# Patient Record
Sex: FEMALE | Race: WHITE | NOT HISPANIC OR LATINO | URBAN - METROPOLITAN AREA
[De-identification: names, ages, dates, MRNs, and addresses within clinical notes are randomized per-mention and may not be internally consistent; named-entity substitution may affect disease eponyms.]

---

## 2020-10-17 ENCOUNTER — HOSPITAL ENCOUNTER (OUTPATIENT)
Facility: MEDICAL CENTER | Age: 30
End: 2020-10-17
Payer: COMMERCIAL

## 2020-10-18 LAB
COVID ORDER STATUS COVID19: NORMAL
SARS-COV-2 RNA RESP QL NAA+PROBE: NOTDETECTED
SPECIMEN SOURCE: NORMAL

## 2022-01-05 ENCOUNTER — HOSPITAL ENCOUNTER (OUTPATIENT)
Facility: MEDICAL CENTER | Age: 32
End: 2022-01-05
Attending: PHYSICIAN ASSISTANT
Payer: OTHER GOVERNMENT

## 2022-01-05 ENCOUNTER — OFFICE VISIT (OUTPATIENT)
Dept: URGENT CARE | Facility: CLINIC | Age: 32
End: 2022-01-05
Payer: OTHER GOVERNMENT

## 2022-01-05 VITALS
OXYGEN SATURATION: 98 % | DIASTOLIC BLOOD PRESSURE: 64 MMHG | RESPIRATION RATE: 16 BRPM | SYSTOLIC BLOOD PRESSURE: 112 MMHG | WEIGHT: 160.6 LBS | HEART RATE: 94 BPM | TEMPERATURE: 98.3 F | BODY MASS INDEX: 28.46 KG/M2 | HEIGHT: 63 IN

## 2022-01-05 DIAGNOSIS — Z20.822 SUSPECTED COVID-19 VIRUS INFECTION: ICD-10-CM

## 2022-01-05 PROCEDURE — U0003 INFECTIOUS AGENT DETECTION BY NUCLEIC ACID (DNA OR RNA); SEVERE ACUTE RESPIRATORY SYNDROME CORONAVIRUS 2 (SARS-COV-2) (CORONAVIRUS DISEASE [COVID-19]), AMPLIFIED PROBE TECHNIQUE, MAKING USE OF HIGH THROUGHPUT TECHNOLOGIES AS DESCRIBED BY CMS-2020-01-R: HCPCS

## 2022-01-05 PROCEDURE — 99203 OFFICE O/P NEW LOW 30 MIN: CPT | Mod: CS | Performed by: PHYSICIAN ASSISTANT

## 2022-01-05 PROCEDURE — U0005 INFEC AGEN DETEC AMPLI PROBE: HCPCS

## 2022-01-06 DIAGNOSIS — Z20.822 SUSPECTED COVID-19 VIRUS INFECTION: ICD-10-CM

## 2022-01-06 LAB — COVID ORDER STATUS COVID19: NORMAL

## 2022-01-06 ASSESSMENT — ENCOUNTER SYMPTOMS
SHORTNESS OF BREATH: 0
FEVER: 1
COUGH: 1
MYALGIAS: 1
CHILLS: 1
VOMITING: 0
NAUSEA: 0
DIARRHEA: 0
HEADACHES: 1
SORE THROAT: 0
SPUTUM PRODUCTION: 1
WHEEZING: 0
ABDOMINAL PAIN: 0

## 2022-01-06 NOTE — PROGRESS NOTES
"Arti Vital is a 31 y.o. female who presents with Cough (Tested positive with an at-home test, but needs a PCR for work.  Cough, fever (102.0 on Tuesday), phlegm, runny nose x 2 days)            Fever   This is a new problem. Episode onset: 2 days. The problem occurs intermittently. The problem has been unchanged. The maximum temperature noted was 102 to 102.9 F. Associated symptoms include congestion, coughing (for 1 week ), headaches, muscle aches and sleepiness. Pertinent negatives include no abdominal pain, chest pain, diarrhea, ear pain, nausea, rash, sore throat, vomiting or wheezing.   Risk factors: sick contacts ( and children have been sick. Patient works at a school.)      The patient is vaccinated. She took a home COVID test tonight and it came back positive.      No past medical history on file.    No past surgical history on file.    No family history on file.    No Known Allergies    Medications, Allergies, and current problem list reviewed today in Epic      Review of Systems   Constitutional: Positive for chills, fever and malaise/fatigue.   HENT: Positive for congestion. Negative for ear pain and sore throat.    Respiratory: Positive for cough (for 1 week ) and sputum production. Negative for shortness of breath and wheezing.    Cardiovascular: Negative for chest pain and leg swelling.   Gastrointestinal: Negative for abdominal pain, diarrhea, nausea and vomiting.   Musculoskeletal: Positive for myalgias.   Skin: Negative for rash.   Neurological: Positive for headaches.         All other systems reviewed and are negative.       Objective     /64 (BP Location: Right arm, Patient Position: Sitting, BP Cuff Size: Adult)   Pulse 94   Temp 36.8 °C (98.3 °F) (Temporal)   Resp 16   Ht 1.6 m (5' 3\")   Wt 72.8 kg (160 lb 9.6 oz)   SpO2 98%   BMI 28.45 kg/m²      Physical Exam  Constitutional:       General: She is not in acute distress.     Appearance: She is " ill-appearing.   HENT:      Head: Normocephalic and atraumatic.      Nose: Nose normal.      Mouth/Throat:      Mouth: Mucous membranes are moist.      Pharynx: No posterior oropharyngeal erythema.   Eyes:      Conjunctiva/sclera: Conjunctivae normal.   Cardiovascular:      Rate and Rhythm: Normal rate and regular rhythm.      Heart sounds: Normal heart sounds.   Pulmonary:      Effort: Pulmonary effort is normal. No respiratory distress.      Breath sounds: Normal breath sounds. No wheezing, rhonchi or rales.   Skin:     General: Skin is warm and dry.      Findings: No rash.   Neurological:      General: No focal deficit present.      Mental Status: She is alert and oriented to person, place, and time.   Psychiatric:         Mood and Affect: Mood normal.         Behavior: Behavior normal.         Thought Content: Thought content normal.         Judgment: Judgment normal.                             Assessment & Plan        1. Suspected COVID-19 virus infection    - SARS-CoV-2 PCR (24 hour In-House): Collect NP swab in VTM; Future- pending.  Isolation guidelines, conservative measures and ER precautions discussed.     Differential diagnoses, Supportive care, and indications for immediate follow-up discussed with patient.   Pathogenesis of diagnosis discussed including typical length and natural progression.   Instructed to return to clinic or nearest emergency department for any change in condition, further concerns, or worsening of symptoms.    The patient demonstrated a good understanding and agreed with the treatment plan.    Gretta Cevallos P.A.-C.

## 2022-01-07 LAB
SARS-COV-2 RNA RESP QL NAA+PROBE: DETECTED
SPECIMEN SOURCE: ABNORMAL

## 2023-04-25 ENCOUNTER — OFFICE VISIT (OUTPATIENT)
Dept: CARDIOLOGY | Facility: MEDICAL CENTER | Age: 33
End: 2023-04-25
Payer: OTHER GOVERNMENT

## 2023-04-25 VITALS
HEIGHT: 62 IN | BODY MASS INDEX: 29.44 KG/M2 | RESPIRATION RATE: 16 BRPM | DIASTOLIC BLOOD PRESSURE: 64 MMHG | SYSTOLIC BLOOD PRESSURE: 118 MMHG | WEIGHT: 160 LBS | HEART RATE: 70 BPM | OXYGEN SATURATION: 99 %

## 2023-04-25 DIAGNOSIS — I47.10 SVT (SUPRAVENTRICULAR TACHYCARDIA) (HCC): ICD-10-CM

## 2023-04-25 DIAGNOSIS — R00.2 PALPITATIONS: ICD-10-CM

## 2023-04-25 DIAGNOSIS — R00.9 ABNORMAL HEART RATE: ICD-10-CM

## 2023-04-25 LAB — EKG IMPRESSION: NORMAL

## 2023-04-25 PROCEDURE — 93000 ELECTROCARDIOGRAM COMPLETE: CPT | Performed by: INTERNAL MEDICINE

## 2023-04-25 PROCEDURE — 99204 OFFICE O/P NEW MOD 45 MIN: CPT | Performed by: INTERNAL MEDICINE

## 2023-04-25 NOTE — PROGRESS NOTES
"    Cardiology Initial Consultation Note    Date of note:    4/25/2023      Patient Name: Kal Vital     YOB: 1990  MRN:              [id number]    Chief Complaint: Palpitations    Kal Vital is a 32 y.o. female  patient presented today with palpitations.  She has very distinct episodes of sudden onset palpitations, heart rate goes up to 170 associated with chest pain, shortness of breath, head pounding.  Sometimes she wakes up in the middle of the night with palpitations as well.  Also complains of occasional chest pains does not last long, not associated with activity.    Physical Exam:  Ambulatory Vitals  /64 (BP Location: Left arm, Patient Position: Sitting, BP Cuff Size: Adult)   Pulse 70   Resp 16   Ht 1.575 m (5' 2\")   Wt 72.6 kg (160 lb)   SpO2 99%    Oxygen Therapy:  Pulse Oximetry: 99 %  BP Readings from Last 4 Encounters:   04/25/23 118/64   01/05/22 112/64       Weight/BMI: Body mass index is 29.26 kg/m².  Wt Readings from Last 4 Encounters:   04/25/23 72.6 kg (160 lb)   01/05/22 72.8 kg (160 lb 9.6 oz)           General: Well appearing and in no apparent distress  Neck: carotid bruits absent  Lungs: respiratory sounds  normal  Heart: Regular rhythm,  No palpable thrills on palpation, murmurs absent, no rubs,   Lowe extremity edema absent.     EKG normal sinus rhythm.    Medical Decision Making:  Problem List Items Addressed This Visit       Palpitations     Other Visit Diagnoses       Abnormal heart rate        Relevant Orders    EKG (Completed)    SVT (supraventricular tachycardia) (HCC)        Relevant Orders    Holter Monitor Study    EC-ECHOCARDIOGRAM COMPLETE W/O CONT          32-year-old female patient with palpitations, likely supraventricular tachycardia by history.  Recommend 2-week Holter monitor, echocardiogram to further evaluate.  Vagal maneuvers discussed.  Further recommendations based on test results.    Follow-up in 6 months or sooner depending upon the " test results.    This note was dictated using Dragon speech recognition software.    Marcial GONZALEZ  Interventional cardiologist  [facility], [facility]  [address] [address]  Phone: [phone]  Fax: [phone]

## 2023-05-02 ENCOUNTER — NON-PROVIDER VISIT (OUTPATIENT)
Dept: CARDIOLOGY | Facility: MEDICAL CENTER | Age: 33
End: 2023-05-02
Attending: INTERNAL MEDICINE
Payer: OTHER GOVERNMENT

## 2023-05-02 DIAGNOSIS — I49.1 PREMATURE ATRIAL CONTRACTIONS: ICD-10-CM

## 2023-05-02 DIAGNOSIS — I47.10 SVT (SUPRAVENTRICULAR TACHYCARDIA) (HCC): ICD-10-CM

## 2023-05-02 DIAGNOSIS — I49.3 PVCS (PREMATURE VENTRICULAR CONTRACTIONS): ICD-10-CM

## 2023-05-24 ENCOUNTER — APPOINTMENT (OUTPATIENT)
Dept: CARDIOLOGY | Facility: MEDICAL CENTER | Age: 33
End: 2023-05-24
Payer: OTHER GOVERNMENT

## 2023-05-24 ENCOUNTER — TELEPHONE (OUTPATIENT)
Dept: CARDIOLOGY | Facility: MEDICAL CENTER | Age: 33
End: 2023-05-24

## 2023-05-25 ENCOUNTER — APPOINTMENT (OUTPATIENT)
Dept: CARDIOLOGY | Facility: MEDICAL CENTER | Age: 33
End: 2023-05-25
Payer: OTHER GOVERNMENT

## 2023-05-25 PROCEDURE — 93248 EXT ECG>7D<15D REV&INTERPJ: CPT | Performed by: INTERNAL MEDICINE

## 2023-05-26 NOTE — PROGRESS NOTES
Patient enrolled in the 14 day Zio XT Holter monitoring program per Marcial Davis MD..   >In-Clinic hook-up..   >Currently pending EOS.

## 2023-06-01 ENCOUNTER — OFFICE VISIT (OUTPATIENT)
Dept: CARDIOLOGY | Facility: MEDICAL CENTER | Age: 33
End: 2023-06-01
Payer: OTHER GOVERNMENT

## 2023-06-01 VITALS
HEIGHT: 62 IN | WEIGHT: 159 LBS | SYSTOLIC BLOOD PRESSURE: 118 MMHG | BODY MASS INDEX: 29.26 KG/M2 | DIASTOLIC BLOOD PRESSURE: 70 MMHG | RESPIRATION RATE: 14 BRPM | HEART RATE: 63 BPM | OXYGEN SATURATION: 100 %

## 2023-06-01 DIAGNOSIS — R00.2 PALPITATIONS: ICD-10-CM

## 2023-06-01 PROCEDURE — 3074F SYST BP LT 130 MM HG: CPT

## 2023-06-01 PROCEDURE — 3078F DIAST BP <80 MM HG: CPT

## 2023-06-01 PROCEDURE — 99213 OFFICE O/P EST LOW 20 MIN: CPT

## 2023-06-01 PROCEDURE — 99211 OFF/OP EST MAY X REQ PHY/QHP: CPT

## 2023-06-01 ASSESSMENT — ENCOUNTER SYMPTOMS
MUSCULOSKELETAL NEGATIVE: 1
DEPRESSION: 0
PND: 0
ORTHOPNEA: 0
NERVOUS/ANXIOUS: 0
EYES NEGATIVE: 1
SHORTNESS OF BREATH: 0
NEUROLOGICAL NEGATIVE: 1
CONSTITUTIONAL NEGATIVE: 1
PALPITATIONS: 0
GASTROINTESTINAL NEGATIVE: 1

## 2023-06-01 NOTE — PROGRESS NOTES
Chief Complaint   Patient presents with    Palpitations       Subjective     Kal Vital is a 32 y.o. female who presents today for follow-up of her palpitations.     They are a patient of Dr. Davis, last seen on 4/25/2023, at that visit she was being evaluated for frequent palpitations, she was asked to complete a cardiac event monitor and a echocardiogram.  Cardiac event monitor did not show any significant arrhythmias.    Since their last visit they have been feeling really well, no palpitations since cutting out caffeine. She did do carotid massage twice, which lowered her HR.     No symptoms of chest pain, palpitations, shortness of breath, exercise intolerance, dyspnea, or lower extremity edema.      History reviewed. No pertinent past medical history.  History reviewed. No pertinent surgical history.  History reviewed. No pertinent family history.  Social History     Socioeconomic History    Marital status:      Spouse name: Not on file    Number of children: Not on file    Years of education: Not on file    Highest education level: Not on file   Occupational History    Not on file   Tobacco Use    Smoking status: Never    Smokeless tobacco: Never   Vaping Use    Vaping Use: Never used   Substance and Sexual Activity    Alcohol use: Yes     Comment: rarely    Drug use: Never    Sexual activity: Yes     Partners: Male   Other Topics Concern    Not on file   Social History Narrative    Not on file     Social Determinants of Health     Financial Resource Strain: Not on file   Food Insecurity: Not on file   Transportation Needs: Not on file   Physical Activity: Not on file   Stress: Not on file   Social Connections: Not on file   Intimate Partner Violence: Not on file   Housing Stability: Not on file     No Known Allergies  No outpatient encounter medications on file as of 6/1/2023.     No facility-administered encounter medications on file as of 6/1/2023.     Review of Systems   Constitutional:  "Negative.    HENT: Negative.     Eyes: Negative.    Respiratory:  Negative for shortness of breath.    Cardiovascular:  Negative for chest pain, palpitations, orthopnea, leg swelling and PND.   Gastrointestinal: Negative.    Genitourinary: Negative.    Musculoskeletal: Negative.    Skin: Negative.    Neurological: Negative.    Endo/Heme/Allergies: Negative.    Psychiatric/Behavioral:  Negative for depression. The patient is not nervous/anxious.               Objective     /70 (BP Location: Left arm, Patient Position: Sitting, BP Cuff Size: Adult)   Pulse 63   Resp 14   Ht 1.575 m (5' 2\")   Wt 72.1 kg (159 lb)   SpO2 100%   BMI 29.08 kg/m²     Physical Exam  Constitutional:       Appearance: Normal appearance.   HENT:      Head: Normocephalic and atraumatic.   Neck:      Vascular: No JVD.   Cardiovascular:      Rate and Rhythm: Normal rate and regular rhythm.      Pulses: Normal pulses.      Heart sounds: Normal heart sounds. No murmur heard.     No friction rub.   Pulmonary:      Effort: Pulmonary effort is normal. No respiratory distress.      Breath sounds: Normal breath sounds.   Abdominal:      General: There is no distension.      Palpations: Abdomen is soft.   Musculoskeletal:      Right lower leg: No edema.      Left lower leg: No edema.   Skin:     General: Skin is warm and dry.   Neurological:      General: No focal deficit present.      Mental Status: She is alert and oriented to person, place, and time.   Psychiatric:         Mood and Affect: Mood normal.         Behavior: Behavior normal.            No results found for: CHOLSTRLTOT, LDL, HDL, TRIGLYCERIDE    Lab Results   Component Value Date/Time    SODIUM 136 11/05/2021 01:42 PM    POTASSIUM 3.8 11/05/2021 01:42 PM    CHLORIDE 106 11/05/2021 01:42 PM    CO2 25.0 11/05/2021 01:42 PM    GLUCOSE 86 11/05/2021 01:42 PM    BUN 9.0 11/05/2021 01:42 PM    CREATININE 0.7 11/05/2021 01:42 PM    BUNCREATRAT 12.9 11/05/2021 01:42 PM     No results " found for: ALKPHOSPHAT, ASTSGOT, ALTSGPT, TBILIRUBIN     Cardiac event monitor 5/24/2023  ZIO PATCH REPORT (05/02/23-05/16/23)   Zio Patch study showing predominately sinus rhythm with maximum rate of 158 bpm, minimum rate of 40 bpm, average of 77 bpm.   Atrial fibrillation: None.   Supraventricular tachycardia: None.   Pauses: None.   Heart block: None.   Ventricular tachycardia: None.   Rare <1% burden of premature atrial contractions.   Rare <1% burden of premature ventricular contractions.     Sinus rhythm noted with patient event.     Assessment & Plan     1. Palpitations            Medical Decision Making: Today's Assessment/Status/Plan:        Palpitations  -No significant arrhythmia noted on event monitor  -Echocardiogram pending, can cancel if no symptoms between now and then  - no palpitations since stopping caffeine     Follow-up with Cardiology as needed    This note was dictated using Dragon speech recognition software.

## 2023-08-18 ENCOUNTER — HOSPITAL ENCOUNTER (OUTPATIENT)
Dept: CARDIOLOGY | Facility: MEDICAL CENTER | Age: 33
End: 2023-08-18
Attending: INTERNAL MEDICINE
Payer: OTHER GOVERNMENT